# Patient Record
Sex: FEMALE | Race: WHITE
[De-identification: names, ages, dates, MRNs, and addresses within clinical notes are randomized per-mention and may not be internally consistent; named-entity substitution may affect disease eponyms.]

---

## 2018-02-13 ENCOUNTER — HOSPITAL ENCOUNTER (INPATIENT)
Dept: HOSPITAL 92 - SURG A | Age: 83
LOS: 8 days | Discharge: HOME | DRG: 470 | End: 2018-02-21
Attending: ORTHOPAEDIC SURGERY | Admitting: ORTHOPAEDIC SURGERY
Payer: MEDICARE

## 2018-02-13 ENCOUNTER — HOSPITAL ENCOUNTER (OUTPATIENT)
Dept: HOSPITAL 92 - LABBT | Age: 83
Discharge: HOME | End: 2018-02-13
Attending: ORTHOPAEDIC SURGERY
Payer: MEDICARE

## 2018-02-13 VITALS — BODY MASS INDEX: 30.9 KG/M2

## 2018-02-13 DIAGNOSIS — Z01.810: Primary | ICD-10-CM

## 2018-02-13 DIAGNOSIS — E55.9: ICD-10-CM

## 2018-02-13 DIAGNOSIS — M17.12: Primary | ICD-10-CM

## 2018-02-13 DIAGNOSIS — I10: ICD-10-CM

## 2018-02-13 DIAGNOSIS — Z87.891: ICD-10-CM

## 2018-02-13 DIAGNOSIS — E78.5: ICD-10-CM

## 2018-02-13 DIAGNOSIS — M17.12: ICD-10-CM

## 2018-02-13 DIAGNOSIS — F32.9: ICD-10-CM

## 2018-02-13 DIAGNOSIS — K21.9: ICD-10-CM

## 2018-02-13 DIAGNOSIS — F41.9: ICD-10-CM

## 2018-02-13 DIAGNOSIS — E03.9: ICD-10-CM

## 2018-02-13 DIAGNOSIS — Z01.812: ICD-10-CM

## 2018-02-13 LAB
ANION GAP SERPL CALC-SCNC: 13 MMOL/L (ref 10–20)
APTT PPP: 25.1 SEC (ref 22.9–36.1)
BUN SERPL-MCNC: 21 MG/DL (ref 9.8–20.1)
CALCIUM SERPL-MCNC: 9.4 MG/DL (ref 7.8–10.44)
CHLORIDE SERPL-SCNC: 104 MMOL/L (ref 98–107)
CO2 SERPL-SCNC: 25 MMOL/L (ref 23–31)
CREAT CL PREDICTED SERPL C-G-VRATE: 0 ML/MIN (ref 70–130)
GLUCOSE SERPL-MCNC: 99 MG/DL (ref 83–110)
HGB BLD-MCNC: 13.1 G/DL (ref 12–16)
INR PPP: 0.9
MCH RBC QN AUTO: 27.6 PG (ref 27–31)
MCV RBC AUTO: 83.7 FL (ref 81–99)
PLATELET # BLD AUTO: 244 THOU/UL (ref 130–400)
POTASSIUM SERPL-SCNC: 3.1 MMOL/L (ref 3.5–5.1)
PROTHROMBIN TIME: 12.7 SEC (ref 12–14.7)
RBC # BLD AUTO: 4.75 MILL/UL (ref 4.2–5.4)
SODIUM SERPL-SCNC: 139 MMOL/L (ref 136–145)
WBC # BLD AUTO: 5.6 THOU/UL (ref 4.8–10.8)

## 2018-02-13 PROCEDURE — C1713 ANCHOR/SCREW BN/BN,TIS/BN: HCPCS

## 2018-02-13 PROCEDURE — 85027 COMPLETE CBC AUTOMATED: CPT

## 2018-02-13 PROCEDURE — 93005 ELECTROCARDIOGRAM TRACING: CPT

## 2018-02-13 PROCEDURE — 86901 BLOOD TYPING SEROLOGIC RH(D): CPT

## 2018-02-13 PROCEDURE — 85610 PROTHROMBIN TIME: CPT

## 2018-02-13 PROCEDURE — 83036 HEMOGLOBIN GLYCOSYLATED A1C: CPT

## 2018-02-13 PROCEDURE — A4306 DRUG DELIVERY SYSTEM <=50 ML: HCPCS

## 2018-02-13 PROCEDURE — C1776 JOINT DEVICE (IMPLANTABLE): HCPCS

## 2018-02-13 PROCEDURE — 36415 COLL VENOUS BLD VENIPUNCTURE: CPT

## 2018-02-13 PROCEDURE — 85730 THROMBOPLASTIN TIME PARTIAL: CPT

## 2018-02-13 PROCEDURE — 86900 BLOOD TYPING SEROLOGIC ABO: CPT

## 2018-02-13 PROCEDURE — 80053 COMPREHEN METABOLIC PANEL: CPT

## 2018-02-13 PROCEDURE — 83735 ASSAY OF MAGNESIUM: CPT

## 2018-02-13 PROCEDURE — 87081 CULTURE SCREEN ONLY: CPT

## 2018-02-13 PROCEDURE — 93010 ELECTROCARDIOGRAM REPORT: CPT

## 2018-02-13 PROCEDURE — 86850 RBC ANTIBODY SCREEN: CPT

## 2018-02-13 PROCEDURE — 80048 BASIC METABOLIC PNL TOTAL CA: CPT

## 2018-02-13 PROCEDURE — S0020 INJECTION, BUPIVICAINE HYDRO: HCPCS

## 2018-02-19 PROCEDURE — 0SRD0J9 REPLACEMENT OF LEFT KNEE JOINT WITH SYNTHETIC SUBSTITUTE, CEMENTED, OPEN APPROACH: ICD-10-PCS | Performed by: ORTHOPAEDIC SURGERY

## 2018-02-19 PROCEDURE — 3E0T3BZ INTRODUCTION OF ANESTHETIC AGENT INTO PERIPHERAL NERVES AND PLEXI, PERCUTANEOUS APPROACH: ICD-10-PCS | Performed by: ORTHOPAEDIC SURGERY

## 2018-02-19 RX ADMIN — ASPIRIN SCH MG: 81 TABLET ORAL at 20:27

## 2018-02-19 RX ADMIN — Medication SCH GM: at 20:27

## 2018-02-19 RX ADMIN — HYDROCODONE BITARTRATE AND ACETAMINOPHEN PRN TAB: 7.5; 325 TABLET ORAL at 22:21

## 2018-02-19 RX ADMIN — DOCUSATE SODIUM 50 MG AND SENNOSIDES 8.6 MG SCH TAB: 8.6; 5 TABLET, FILM COATED ORAL at 20:28

## 2018-02-19 NOTE — RAD
LEFT KNEE TWO VIEWS:

 

History: Post op. 

 

FINDINGS: 

Total knee prosthesis is in place. No fracture. Prosthesis is in good position. 

 

IMPRESSION: 

Post-operative change with placement of left knee prosthesis. 

 

POS: [facility]

## 2018-02-20 LAB
HGB BLD-MCNC: 10.7 G/DL (ref 12–16)
MCH RBC QN AUTO: 27.8 PG (ref 27–31)
MCV RBC AUTO: 85.2 FL (ref 81–99)
PLATELET # BLD AUTO: 171 THOU/UL (ref 130–400)
RBC # BLD AUTO: 3.84 MILL/UL (ref 4.2–5.4)
WBC # BLD AUTO: 7.2 THOU/UL (ref 4.8–10.8)

## 2018-02-20 RX ADMIN — ASPIRIN SCH MG: 81 TABLET ORAL at 20:17

## 2018-02-20 RX ADMIN — DOCUSATE SODIUM 50 MG AND SENNOSIDES 8.6 MG SCH TAB: 8.6; 5 TABLET, FILM COATED ORAL at 20:17

## 2018-02-20 RX ADMIN — HYDROCODONE BITARTRATE AND ACETAMINOPHEN PRN TAB: 7.5; 325 TABLET ORAL at 20:18

## 2018-02-20 RX ADMIN — ASPIRIN SCH MG: 81 TABLET ORAL at 09:30

## 2018-02-20 RX ADMIN — HYDROCODONE BITARTRATE AND ACETAMINOPHEN PRN TAB: 7.5; 325 TABLET ORAL at 13:13

## 2018-02-20 RX ADMIN — HYDROCODONE BITARTRATE AND ACETAMINOPHEN PRN TAB: 7.5; 325 TABLET ORAL at 09:26

## 2018-02-20 RX ADMIN — MULTIPLE VITAMINS W/ MINERALS TAB SCH TAB: TAB at 09:30

## 2018-02-20 RX ADMIN — HYDROCODONE BITARTRATE AND ACETAMINOPHEN PRN TAB: 7.5; 325 TABLET ORAL at 03:48

## 2018-02-20 RX ADMIN — DOCUSATE SODIUM 50 MG AND SENNOSIDES 8.6 MG SCH TAB: 8.6; 5 TABLET, FILM COATED ORAL at 09:30

## 2018-02-20 RX ADMIN — Medication SCH GM: at 04:23

## 2018-02-20 NOTE — CON
DATE OF CONSULTATION:  02/20/2018

 

REASON FOR CONSULTATION:  Medical management.

 

HISTORY OF PRESENT ILLNESS:  The patient is a pleasant 82-year-old female known to us with a history 
of multiple medical problems to include hypertension and hyperlipidemia.  She underwent a left total 
knee arthroplasty on 02/19/2018 and is postoperatively doing well and appears to be hemodynamically s
table, in no acute distress.  Consultation was sought for medical management.

 

She denies any chest, arm or back pain.  She also denies any PND, orthopnea or palpitations.

 

PAST MEDICAL HISTORY:

1.  Hypertension.

2.  Hyperlipidemia.

3.  Gastroesophageal reflux disease.

4.  History of vitamin D insufficiency.  

5.  History of depression and anxiety.  

6.  Hypothyroidism.

 

PAST SURGICAL HISTORY:

1.  History of total hysterectomy.

2.  History of appendectomy.  

 

ALLERGIES:  None.

 

MEDICATIONS:

1.  Atorvastatin 40 mg at bedtime.

2.  Cardura 180 mg

3.  Prozac 20 mg daily.  

4.  Hydrochlorothiazide 25 mg daily.

5.  Hydrocodone p.r.n.

6.  Levothyroxine 100 mcg daily.

7.  Meloxicam 15 mg every day.

8.  Prilosec 40 mg every day.

 

SOCIAL HISTORY:  She does not smoke or drink alcohol.

 

FAMILY HISTORY:  Noncontributory.

 

REVIEW OF SYSTEMS:

GENERAL:  Admits to weakness, fatigue, no fever or chills.

HEENT:  No diplopia, amaurosis fugax, tinnitus, sore throat or hoarseness.

CARDIOVASCULAR:  No chest, arm or back pain.

PULMONARY:  No PE, cough, hemoptysis.  

GASTROINTESTINAL:  No GI bleed, constipation, diarrhea.

GENITOURINARY:  No dysuria, nocturia, oliguria or polyuria.

ENDOCRINE:  No polyphagia, polydipsia or heat or cold intolerance.

MUSCULOSKELETAL:  Admits to arthralgias.  No lupus or myopathy.

NEUROLOGIC:  No history of TIA or seizure.  

 

All other systems are negative.

 

PHYSICAL EXAMINATION:

GENERAL:  [place] female who appears to be in no acute distress.

VITAL SIGNS:  Her blood pressure 140/70, pulse 60, respirations 18.  She is afebrile.

NECK:  Supple with no increased JVP or carotid bruit.  Carotid had good upstroke with no thyromegaly.


COR:  Regular rate and rhythm.

CHEST:  Symmetrical.  Clear to auscultation and percussion.

ABDOMEN:  Soft, nontender with normoactive bowel sounds.  There is no bruit or organomegaly.

EXTREMITIES:  No edema or cyanosis.  She had the left knee wrapped with ace.  She had palpable pedal 
pulses.

SKIN:  There is no evidence of ulcer, lesion or rash.

NEUROLOGIC:  She is awake, alert, and oriented to person, place, and time.

 

LABORATORY DATA:  Her H&H 10.7 and 32.7, white blood cells are fine.  There is no other lab in the 
art.

 

ASSESSMENT:

1.  Status post left total knee arthroplasty.

2.  Hypertension.

3.  Hyperlipidemia.

4.  Depression/anxiety.

5.  Hypothyroidism.

 

PLAN:  

1.  We will resume her home medications.  

2.  We will follow up with lab in the morning if the patient is still here.

 

I thank Dr. Silverman for allowing us to participate in the patient's care.

## 2018-02-20 NOTE — OP
DATE OF PROCEDURE:  02/19/2018

 

PREOPERATIVE DIAGNOSIS:  Osteoarthritis, left knee.

 

POSTOPERATIVE DIAGNOSIS:  Osteoarthritis, left knee.

 

PROCEDURE:  Left total knee arthroplasty.

 

ANESTHESIA:  General.

 

SURGEON:  Luca Silverman M.D.

 

COMPLICATIONS:  None.

 

CONDITION:  Good.

 

ESTIMATED BLOOD LOSS:  Minimal.

 

DRAINS:  None.

 

TOURNIQUET:  Per anesthesia.

 

TECHNIQUE:  Consent was obtained.  The patient was taken to the operating room, placed in the supine 
position.  After adequate general anesthesia was achieved, the patient's left knee was examined.  The
 patient had significant medial arthrosis with varus deformity using the reverse marked crepitus.  Sh
e lacked approximately 10 degrees full extension and had 115 degrees of flexion.  The left knee was t
hen positioned, prepped and draped in usual surgical sterile fashion.  Tourniquet placed on left uppe
r thigh.  Leg was elevated, exsanguinated, and tourniquet inflated prior to incision.  Standard midli
ne vertical incision was made, it was taken down to subcutaneous tissues exposing extensor mechanism.
  Medial parapatellar arthrotomy was performed.  Patella subluxed laterally.  The patient had signifi
cant tricompartmental disease with mainly medial compartment erosion and large osteophytes.  Using in
tramedullary guide, a distal 5 degree valgus resection on the femur was performed using AP and epicon
dylar access.  Proper rotation and position of the size 3 cutting block was placed.  Anterior, poster
ior, and chamfer cuts were completed for the size 3 evolution femur.  Tibia was then subluxed anterio
rly.  Using external guide, appropriate resection was performed measuring from the medial compartment
.  Minimal resection was obtained, the menisci and cruciate ligaments were then debrided and flexion 
and extension gaps were then checked and had good balancing at 0 to 90 degrees with 10 mm spacer.  Th
e patella was then measured approximately 6-7 mm resection performed in a 32 mm patella.  A 29 mm pat
carmen was medialized.  Trial components were then placed 3 femur, 3 tibia, and 29 patella with a 10 mm
 bearing surface.  Put through range of motion.  The patient had full flexion and extension, good bal
ancing and normal patellofemoral alignment tracking.  The surfaces were then irrigated copiously, dri
ed, and the femur, tibia, and patella cemented.  Excess cement removed and again trialed with the 10 
mm bearing surface and a 10 mm medial pivot bearing implant was then placed and snap fit.  Hemostasis
 obtained.  Arthrotomy closed with #2 mersilene, #1 Vicryl, subcutaneous with 0 and 2-0 Vicryl, and t
he skin with staples.  Sterile bulky dressing was applied and the patient was taken to recovery in st
able condition.  Prognosis is good.  Begin rehab protocol.

## 2018-02-21 VITALS — DIASTOLIC BLOOD PRESSURE: 67 MMHG | TEMPERATURE: 97.5 F | SYSTOLIC BLOOD PRESSURE: 123 MMHG

## 2018-02-21 LAB
ALBUMIN SERPL BCG-MCNC: 3.2 G/DL (ref 3.4–4.8)
ALP SERPL-CCNC: 64 U/L (ref 40–150)
ALT SERPL W P-5'-P-CCNC: 8 U/L (ref 8–55)
ANION GAP SERPL CALC-SCNC: 9 MMOL/L (ref 10–20)
AST SERPL-CCNC: 15 U/L (ref 5–34)
BASOPHILS # BLD AUTO: 0 THOU/UL (ref 0–0.2)
BASOPHILS NFR BLD AUTO: 0.4 % (ref 0–1)
BILIRUB SERPL-MCNC: 0.8 MG/DL (ref 0.2–1.2)
BUN SERPL-MCNC: 12 MG/DL (ref 9.8–20.1)
CALCIUM SERPL-MCNC: 8.6 MG/DL (ref 7.8–10.44)
CHLORIDE SERPL-SCNC: 98 MMOL/L (ref 98–107)
CO2 SERPL-SCNC: 32 MMOL/L (ref 23–31)
CREAT CL PREDICTED SERPL C-G-VRATE: 69 ML/MIN (ref 70–130)
EOSINOPHIL # BLD AUTO: 0.1 THOU/UL (ref 0–0.7)
EOSINOPHIL NFR BLD AUTO: 1.2 % (ref 0–10)
GLOBULIN SER CALC-MCNC: 2.6 G/DL (ref 2.4–3.5)
GLUCOSE SERPL-MCNC: 93 MG/DL (ref 83–110)
HGB BLD-MCNC: 10.3 G/DL (ref 12–16)
HGB BLD-MCNC: 10.4 G/DL (ref 12–16)
LYMPHOCYTES # BLD: 1.4 THOU/UL (ref 1.2–3.4)
LYMPHOCYTES NFR BLD AUTO: 16.6 % (ref 21–51)
MCH RBC QN AUTO: 27.5 PG (ref 27–31)
MCH RBC QN AUTO: 27.6 PG (ref 27–31)
MCV RBC AUTO: 85.5 FL (ref 81–99)
MCV RBC AUTO: 85.5 FL (ref 81–99)
MONOCYTES # BLD AUTO: 1.2 THOU/UL (ref 0.11–0.59)
MONOCYTES NFR BLD AUTO: 14.2 % (ref 0–10)
NEUTROPHILS # BLD AUTO: 5.8 THOU/UL (ref 1.4–6.5)
NEUTROPHILS NFR BLD AUTO: 67.6 % (ref 42–75)
PLATELET # BLD AUTO: 175 THOU/UL (ref 130–400)
PLATELET # BLD AUTO: 176 THOU/UL (ref 130–400)
POTASSIUM SERPL-SCNC: 2.6 MMOL/L (ref 3.5–5.1)
POTASSIUM SERPL-SCNC: 2.7 MMOL/L (ref 3.5–5.1)
RBC # BLD AUTO: 3.74 MILL/UL (ref 4.2–5.4)
RBC # BLD AUTO: 3.77 MILL/UL (ref 4.2–5.4)
SODIUM SERPL-SCNC: 136 MMOL/L (ref 136–145)
WBC # BLD AUTO: 7.9 THOU/UL (ref 4.8–10.8)
WBC # BLD AUTO: 8.5 THOU/UL (ref 4.8–10.8)

## 2018-02-21 RX ADMIN — HYDROCODONE BITARTRATE AND ACETAMINOPHEN PRN TAB: 7.5; 325 TABLET ORAL at 09:50

## 2018-02-21 RX ADMIN — MULTIPLE VITAMINS W/ MINERALS TAB SCH TAB: TAB at 08:55

## 2018-02-21 RX ADMIN — HYDROCODONE BITARTRATE AND ACETAMINOPHEN PRN TAB: 7.5; 325 TABLET ORAL at 05:57

## 2018-02-21 RX ADMIN — HYDROCODONE BITARTRATE AND ACETAMINOPHEN PRN TAB: 7.5; 325 TABLET ORAL at 14:12

## 2018-02-21 RX ADMIN — ASPIRIN SCH MG: 81 TABLET ORAL at 08:54

## 2018-02-21 RX ADMIN — DOCUSATE SODIUM 50 MG AND SENNOSIDES 8.6 MG SCH TAB: 8.6; 5 TABLET, FILM COATED ORAL at 08:55

## 2018-07-05 ENCOUNTER — HOSPITAL ENCOUNTER (OUTPATIENT)
Dept: HOSPITAL 92 - LABBT | Age: 83
Discharge: HOME | End: 2018-07-05
Attending: ORTHOPAEDIC SURGERY
Payer: MEDICARE

## 2018-07-05 DIAGNOSIS — Z01.818: Primary | ICD-10-CM

## 2018-07-05 DIAGNOSIS — M17.11: ICD-10-CM

## 2018-07-05 LAB
ANION GAP SERPL CALC-SCNC: 14 MMOL/L (ref 10–20)
APTT PPP: 22.8 SEC (ref 22.9–36.1)
BUN SERPL-MCNC: 16 MG/DL (ref 9.8–20.1)
CALCIUM SERPL-MCNC: 9.7 MG/DL (ref 7.8–10.44)
CHLORIDE SERPL-SCNC: 102 MMOL/L (ref 98–107)
CO2 SERPL-SCNC: 28 MMOL/L (ref 23–31)
CREAT CL PREDICTED SERPL C-G-VRATE: 0 ML/MIN (ref 70–130)
GLUCOSE SERPL-MCNC: 88 MG/DL (ref 83–110)
HGB BLD-MCNC: 11.2 G/DL (ref 12–16)
INR PPP: 1
MCH RBC QN AUTO: 26.2 PG (ref 27–31)
MCV RBC AUTO: 79.8 FL (ref 78–98)
PLATELET # BLD AUTO: 247 THOU/UL (ref 130–400)
POTASSIUM SERPL-SCNC: 3.7 MMOL/L (ref 3.5–5.1)
PROTHROMBIN TIME: 13.3 SEC (ref 12–14.7)
RBC # BLD AUTO: 4.27 MILL/UL (ref 4.2–5.4)
SODIUM SERPL-SCNC: 140 MMOL/L (ref 136–145)
WBC # BLD AUTO: 5.8 THOU/UL (ref 4.8–10.8)

## 2018-07-05 PROCEDURE — 86850 RBC ANTIBODY SCREEN: CPT

## 2018-07-05 PROCEDURE — 86900 BLOOD TYPING SEROLOGIC ABO: CPT

## 2018-07-05 PROCEDURE — 93010 ELECTROCARDIOGRAM REPORT: CPT

## 2018-07-05 PROCEDURE — 85730 THROMBOPLASTIN TIME PARTIAL: CPT

## 2018-07-05 PROCEDURE — 85610 PROTHROMBIN TIME: CPT

## 2018-07-05 PROCEDURE — 93005 ELECTROCARDIOGRAM TRACING: CPT

## 2018-07-05 PROCEDURE — 87081 CULTURE SCREEN ONLY: CPT

## 2018-07-05 PROCEDURE — 86901 BLOOD TYPING SEROLOGIC RH(D): CPT

## 2018-07-05 PROCEDURE — 80048 BASIC METABOLIC PNL TOTAL CA: CPT

## 2018-07-05 PROCEDURE — 85027 COMPLETE CBC AUTOMATED: CPT

## 2018-07-09 ENCOUNTER — HOSPITAL ENCOUNTER (OUTPATIENT)
Dept: HOSPITAL 92 - SDC | Age: 83
LOS: 2 days | Discharge: HOME | End: 2018-07-11
Attending: ORTHOPAEDIC SURGERY
Payer: MEDICARE

## 2018-07-09 VITALS — BODY MASS INDEX: 29.2 KG/M2

## 2018-07-09 DIAGNOSIS — I10: ICD-10-CM

## 2018-07-09 DIAGNOSIS — M19.90: ICD-10-CM

## 2018-07-09 DIAGNOSIS — F32.9: ICD-10-CM

## 2018-07-09 DIAGNOSIS — Z79.82: ICD-10-CM

## 2018-07-09 DIAGNOSIS — K21.9: ICD-10-CM

## 2018-07-09 DIAGNOSIS — M17.11: Primary | ICD-10-CM

## 2018-07-09 DIAGNOSIS — Z79.899: ICD-10-CM

## 2018-07-09 DIAGNOSIS — Z87.891: ICD-10-CM

## 2018-07-09 PROCEDURE — A4216 STERILE WATER/SALINE, 10 ML: HCPCS

## 2018-07-09 PROCEDURE — 96374 THER/PROPH/DIAG INJ IV PUSH: CPT

## 2018-07-09 PROCEDURE — C1713 ANCHOR/SCREW BN/BN,TIS/BN: HCPCS

## 2018-07-09 PROCEDURE — 97139 UNLISTED THERAPEUTIC PX: CPT

## 2018-07-09 PROCEDURE — 36415 COLL VENOUS BLD VENIPUNCTURE: CPT

## 2018-07-09 PROCEDURE — 97150 GROUP THERAPEUTIC PROCEDURES: CPT

## 2018-07-09 PROCEDURE — 97530 THERAPEUTIC ACTIVITIES: CPT

## 2018-07-09 PROCEDURE — C1776 JOINT DEVICE (IMPLANTABLE): HCPCS

## 2018-07-09 PROCEDURE — S0020 INJECTION, BUPIVICAINE HYDRO: HCPCS

## 2018-07-09 PROCEDURE — 27447 TOTAL KNEE ARTHROPLASTY: CPT

## 2018-07-09 PROCEDURE — 85027 COMPLETE CBC AUTOMATED: CPT

## 2018-07-09 PROCEDURE — 0SRC0J9 REPLACEMENT OF RIGHT KNEE JOINT WITH SYNTHETIC SUBSTITUTE, CEMENTED, OPEN APPROACH: ICD-10-PCS | Performed by: ORTHOPAEDIC SURGERY

## 2018-07-09 PROCEDURE — 73560 X-RAY EXAM OF KNEE 1 OR 2: CPT

## 2018-07-09 PROCEDURE — 97116 GAIT TRAINING THERAPY: CPT

## 2018-07-09 RX ADMIN — DOCUSATE SODIUM 50 MG AND SENNOSIDES 8.6 MG SCH TAB: 8.6; 5 TABLET, FILM COATED ORAL at 20:29

## 2018-07-09 RX ADMIN — Medication SCH GM: at 18:46

## 2018-07-09 RX ADMIN — ASPIRIN SCH MG: 81 TABLET ORAL at 20:28

## 2018-07-09 RX ADMIN — Medication SCH: at 20:29

## 2018-07-09 RX ADMIN — Medication SCH: at 15:17

## 2018-07-09 NOTE — RAD
RIGHT KNEE TWO VIEWS:

 

HISTORY:

Status post right knee arthroplasty.

 

COMPARISON:

None.

 

FINDINGS:

Two views of the right knee show the patient to be status post right knee arthroplasty without periha
rdware lucency or fracture.  Air in the soft tissues and overlying skin staples are from recent surge
ry.

 

IMPRESSION:

Status post right knee arthroplasty without evidence of complication.

 

POS: [facility]

## 2018-07-09 NOTE — CON
DATE OF CONSULTATION:  07/09/2018

 

DATE OF ADMISSION:  07/09/2018

 

REASON FOR CONSULTATION:  Medical management.

 

HISTORY OF PRESENT ILLNESS:  Patient is an 83-year-old female with multiple medical problems to [facility]
de hypertension and hyperlipidemia who underwent a right hip replacement in February of this year and
 today has undergone a right knee replacement.  Postoperatively, she is doing well.  Appears to be he
modynamically stable, in no acute distress.  I will provide medical management for this patient while
 hospitalized.

 

PAST MEDICAL HISTORY:  Includes hypertension, hyperlipidemia, gastroesophageal reflux, vitamin D defi
ciency, depression, anxiety and hypothyroidism.

 

PAST SURGICAL HISTORY:  Includes total hysterectomy, appendectomy, right hip replacement and today to
antonina right knee replacement.

 

ALLERGIES:  None.

 

MEDICATIONS:  Include atorvastatin 40 at bedtime, Cardura 100 mg daily, Prozac 20 mg daily, HCTZ 25 m
g daily, hydrocodone p.r.n., levothyroxine 100 mcg daily, Meloxicam 15 mg daily and Prilosec 40 mg da
vicki.

 

SOCIAL HISTORY:  She does not smoke or drink alcoholic beverages.

 

FAMILY HISTORY:  Noncontributory.

 

REVIEW OF SYSTEMS:  GENERAL:  Denies fever, chills, fatigue.  HEENT:  Denies diplopia, loss of vision
, hoarseness, drainage, eye pain.  Ears and nose pain, throat pain.  CHEST:  Denies shortness of ethan
th or dyspnea.  CARDIOVASCULAR:  Denies chest pain or palpitations.

GI:  Denies constipation, diarrhea and nausea.  :  Denies dysuria, oliguria or polyuria.  ENDOCRINE
:  There is no polyphagia, polydipsia, heat or cold intolerance.  MUSCULOSKELETAL:  She is here for r
ight knee pain and currently her greatest pain is actually in her right hip.  NEUROLOGIC:  Neurologic
ally denies TIAs, memory loss, seizures.

SKIN:  No new rashes or lesions.  PSYCHOLOGICAL:  No new depression or anxiety.

 

PHYSICAL EXAMINATION:

VITAL SIGNS:  At the time of admission, blood pressure is 131/58, pulse 60, respirations 16, temperat
ure 98 degrees and O2 sat 98%.

GENERAL:  This is a well-developed, well-nourished, alert, responsive elderly female in no acute dist
ress.

HEENT:  Normocephalic and atraumatic.  Pupils are equal, round, and reactive to light.  Arcus senilis
 bilaterally.  TMs; nares, pharynx clear.

NECK:  Supple, trachea midline.

CHEST:  Clear to auscultation.  Breast exam deferred.

BACK:  Nontender.

HEART:  Regular rate and rhythm, no murmur.

ABDOMEN:  Soft and nontender without organomegaly.

GENITOURINARY:  Deferred.

EXTREMITIES:  Right knee is bandaged with some swelling noted.  Left lower extremity and upper extrem
ities are without clubbing, cyanosis, or edema.  Normal range of motion.

SKIN:  Without acute rashes or lesions.

NEUROLOGIC:  Cranial nerves are intact.  Unable to test gait and cerebellar function at this time.  S
ensory exam is grossly intact except over the right knee surgical area.

 

LABORATORY DATA:  Lab work thus far.  On 07/05/2018, WBCs are 5.8, hemoglobin 11.2, hematocrit 34.1 w
ith platelets at 247,000.  PT is 13.3, INR 1.0 APTT of 22.8.  Sodium is 140, potassium 3.7, chloride 
102, CO2 28, BUN 16, creatinine 0.85 with GFR of 63, glucose is 88, hemoglobin A1c 5.3, calcium 9.7, 
total bilirubin 0.8, AST 15, ALT 8, alkaline phosphatase 64.

 

ASSESSMENT:

1.  Status post right total knee replacement.

2.  Hypertension.

3.  Hyperlipidemia.

4.  Gastroesophageal reflux disease.

 

PLAN:  Plan is to provide the routine medication and serial reevaluation to medically care for this p
atient.  Anticipate a normal postop surgical course and follow up will be in my office after discharg
eMitch

## 2018-07-10 LAB
HGB BLD-MCNC: 9.3 G/DL (ref 12–16)
MCH RBC QN AUTO: 26 PG (ref 27–31)
MCV RBC AUTO: 80.2 FL (ref 78–98)
PLATELET # BLD AUTO: 193 THOU/UL (ref 130–400)
RBC # BLD AUTO: 3.57 MILL/UL (ref 4.2–5.4)
WBC # BLD AUTO: 7.3 THOU/UL (ref 4.8–10.8)

## 2018-07-10 RX ADMIN — Medication SCH GM: at 03:00

## 2018-07-10 RX ADMIN — BUPIVACAINE HYDROCHLORIDE SCH MLS: 5 INJECTION, SOLUTION EPIDURAL; INTRACAUDAL at 01:05

## 2018-07-10 RX ADMIN — MULTIPLE VITAMINS W/ MINERALS TAB SCH TAB: TAB at 09:32

## 2018-07-10 RX ADMIN — Medication SCH ML: at 20:48

## 2018-07-10 RX ADMIN — CALCIUM CARBONATE PRN MG: 500 TABLET, CHEWABLE ORAL at 11:09

## 2018-07-10 RX ADMIN — CALCIUM CARBONATE PRN MG: 500 TABLET, CHEWABLE ORAL at 01:05

## 2018-07-10 RX ADMIN — HYDROCODONE BITARTRATE AND ACETAMINOPHEN PRN TAB: 7.5; 325 TABLET ORAL at 18:11

## 2018-07-10 RX ADMIN — DOCUSATE SODIUM 50 MG AND SENNOSIDES 8.6 MG SCH TAB: 8.6; 5 TABLET, FILM COATED ORAL at 09:32

## 2018-07-10 RX ADMIN — HYDROCODONE BITARTRATE AND ACETAMINOPHEN PRN TAB: 7.5; 325 TABLET ORAL at 00:16

## 2018-07-10 RX ADMIN — ASPIRIN SCH MG: 81 TABLET ORAL at 20:46

## 2018-07-10 RX ADMIN — Medication SCH ML: at 09:50

## 2018-07-10 RX ADMIN — HYDROCODONE BITARTRATE AND ACETAMINOPHEN PRN TAB: 7.5; 325 TABLET ORAL at 14:04

## 2018-07-10 RX ADMIN — BUPIVACAINE HYDROCHLORIDE SCH MLS: 5 INJECTION, SOLUTION EPIDURAL; INTRACAUDAL at 14:37

## 2018-07-10 RX ADMIN — CALCIUM CARBONATE PRN MG: 500 TABLET, CHEWABLE ORAL at 23:14

## 2018-07-10 RX ADMIN — CALCIUM CARBONATE PRN MG: 500 TABLET, CHEWABLE ORAL at 18:11

## 2018-07-10 RX ADMIN — ASPIRIN SCH MG: 81 TABLET ORAL at 09:30

## 2018-07-10 RX ADMIN — DOCUSATE SODIUM 50 MG AND SENNOSIDES 8.6 MG SCH TAB: 8.6; 5 TABLET, FILM COATED ORAL at 20:46

## 2018-07-10 RX ADMIN — HYDROCODONE BITARTRATE AND ACETAMINOPHEN PRN TAB: 7.5; 325 TABLET ORAL at 06:00

## 2018-07-10 NOTE — OP
DATE OF PROCEDURE:  07/09/2018

 

PREOPERATIVE DIAGNOSIS:  Osteoarthrosis, right knee.

 

POSTOPERATIVE DIAGNOSIS:  Osteoarthrosis, right knee.

 

PROCEDURE:  Right total knee arthroplasty.

 

ANESTHESIA:  General.

 

SURGEON:  Dr. Luca Silverman 

 

ASSISTANT:   KAREN Umanzor

 

COMPLICATIONS:  None.

 

CONDITION:  Good.

 

ESTIMATED BLOOD LOSS:  Minimal.

 

DRAINS:  None.

 

TOURNIQUET:  Per anesthesia.

 

TECHNIQUE:  Consent was obtained.  The patient was taken to the operating room and placed in supine p
osition.  Adequate general anesthesia achieved, the patient's right knee was examined.  She had appro
ximately 110 degrees of flexion, lacked 10 degrees full extension, mild varus deformity.  She was the
n positioned, prepped, and draped in usual sterile fashion.  Tourniquet placed to right upper thigh. 
 Leg was elevated, exsanguinated, and tourniquet inflated prior to incision.  Standard midline vertic
al incision was made.  It was taken down to subcutaneous tissue exposing extensor mechanism.  Medial 
parapatellar arthrotomy was performed.  Patella subluxed laterally.  The patient had advanced medial 
and patellofemoral disease.  Using intramedullary guide, distal 5 degree resection on the femur was p
erformed.  Using AP and epicondylar access, proper rotation and position, a size 3 cutting block was 
placed.  Anterior, posterior, and chamfer cuts were completed for the size 3 evolution femur.  Tibia 
was then subluxed anteriorly.  Appropriate resection was performed using external guide.  Cruciate li
gaments were debrided and a flexion, extension gaps checked at 0 and 90 degrees, good balancing was n
oted.  The patella was then measured approximately 6-7 mm resection was performed and a 29 mm patella
 was medialized.  The trial components were then placed, a 3 femur, 3 tibia, 10 mm bearing surface, 2
9 patella was put through range of motion.  The patient had full flexion and extension, good alignmen
t and range of motion was noted.  The tibial baseplate was then marked and prepared with a broach.  A
ll surfaces were irrigated copiously, dried.  Femur, tibia, and patella cemented.  Again, trialed wit
h the 10 mm bearing surface.  This was chosen and snap fit.  After copious irrigation arthrotomy clos
ed #2 mersilene, #1 Vicryl, subq with 0 and 2-0 Vicryl, and the skin with staples.  Sterile bulky miguel
ssing was applied and the patient taken to recovery.  Prognosis is  good.

## 2018-07-11 VITALS — SYSTOLIC BLOOD PRESSURE: 110 MMHG | DIASTOLIC BLOOD PRESSURE: 57 MMHG | TEMPERATURE: 98.1 F

## 2018-07-11 LAB
HGB BLD-MCNC: 10.2 G/DL (ref 12–16)
MCH RBC QN AUTO: 25.6 PG (ref 27–31)
MCV RBC AUTO: 79.6 FL (ref 78–98)
PLATELET # BLD AUTO: 226 THOU/UL (ref 130–400)
RBC # BLD AUTO: 3.98 MILL/UL (ref 4.2–5.4)
WBC # BLD AUTO: 10.2 THOU/UL (ref 4.8–10.8)

## 2018-07-11 RX ADMIN — Medication SCH ML: at 09:19

## 2018-07-11 RX ADMIN — HYDROCODONE BITARTRATE AND ACETAMINOPHEN PRN TAB: 7.5; 325 TABLET ORAL at 12:19

## 2018-07-11 RX ADMIN — HYDROCODONE BITARTRATE AND ACETAMINOPHEN PRN TAB: 7.5; 325 TABLET ORAL at 06:21

## 2018-07-11 RX ADMIN — MULTIPLE VITAMINS W/ MINERALS TAB SCH TAB: TAB at 09:10

## 2018-07-11 RX ADMIN — BUPIVACAINE HYDROCHLORIDE SCH MLS: 5 INJECTION, SOLUTION EPIDURAL; INTRACAUDAL at 02:53

## 2018-07-11 RX ADMIN — DOCUSATE SODIUM 50 MG AND SENNOSIDES 8.6 MG SCH TAB: 8.6; 5 TABLET, FILM COATED ORAL at 09:10

## 2018-07-11 NOTE — PRG
DATE OF SERVICE:  07/11/2018 

 

The patient rested well.  Her knee is bothering her this morning and she is up getting her bath.

 

PHYSICAL EXAMINATION: 

VITAL SIGNS:  Stable with blood pressure on 121/69, pulse 64, temperature 99, respirations 18, O2 sat
 94%.

GENERAL:  Alert, elderly female in no acute distress, responsive.

HEENT:  Clear. 

NECK:  Supple.

CHEST:  Clear to auscultation.

HEART:  Regular rate and rhythm, no murmur.

ABDOMEN:  Soft and nontender.

EXTREMITIES:  Right lower extremity with bandage in place.  No other significant swelling, erythema, 
mild postoperative edema noted.  Moderate tenderness and pain with the pain rated at a 6/10 at this t
becky.

NEUROLOGIC:  Nonfocal and intact.

 

ASSESSMENT:

1.  Postoperative day right total knee replacement #2 and doing well.

2.  Hypertension, stable.

 

PLAN:  Continue to follow the patient to discharge.  She is doing well, no specific complaints and an
ticipate a routine hospital course.  We will follow up after discharge.

## 2019-04-22 ENCOUNTER — HOSPITAL ENCOUNTER (OUTPATIENT)
Dept: HOSPITAL 92 - BICULT | Age: 84
Discharge: HOME | End: 2019-04-22
Attending: FAMILY MEDICINE
Payer: MEDICARE

## 2019-04-22 DIAGNOSIS — E07.89: ICD-10-CM

## 2019-04-22 DIAGNOSIS — E04.9: Primary | ICD-10-CM

## 2019-04-22 PROCEDURE — 76536 US EXAM OF HEAD AND NECK: CPT

## 2019-04-22 NOTE — ULT
Thyroid ultrasound



INDICATION: Thyroid mass



TECHNIQUE: Grayscale and color Doppler images were obtained of the thyroid gland.



COMPARISON: None



FINDINGS:



Right thyroid lobe: The right thyroid lobe measures 5.4 x 2.0 x 2.1 cm. There is a well-circumscribed
 hyperechoic, mixed cystic and solid nodule involving the mid to inferior pole of the right thyroid

gland measuring 3.7 x 1.97 x 3.1 cm.



Thyroid isthmus: The thyroid isthmus measures 0.68 cm. There is a 5 x 6 x 6 mm hypoechoic well-circum
scribed solid nodule within the right isthmus with associated macrocalcifications.



Left thyroid lobe: The left thyroid lobe measures 4.4 x 3.0 x 2.2 cm. There is a mixed cystic and sol
id nodule involving the mid to inferior pole of the left thyroid gland measuring 2.9 x 2.5 x 1.9

cm.



IMPRESSION:

1. TI-RADS Category 2 lesion of the right thyroid lobe. No sonographic follow up is recommended.

2. TI-RADS Category 4 lesion of the thyroid isthmus. This lesion is below 1 cm in size. No sonographi
c follow up is recommended.

3. TI-RADS Category 2 lesion of the left thyroid lobe. No sonographic follow up is recommended.



Reported By: Cooper Reagan 

Electronically Signed:  4/22/2019 1:27 PM

## 2021-01-29 ENCOUNTER — HOSPITAL ENCOUNTER (OUTPATIENT)
Dept: HOSPITAL 92 - BICMRI | Age: 86
Discharge: HOME | End: 2021-01-29
Attending: FAMILY MEDICINE
Payer: MEDICARE

## 2021-01-29 DIAGNOSIS — D32.9: ICD-10-CM

## 2021-01-29 DIAGNOSIS — H02.401: Primary | ICD-10-CM

## 2021-01-29 LAB — ESTIMATED GFR-MDRD - POC: 53

## 2021-01-29 PROCEDURE — A9579 GAD-BASE MR CONTRAST NOS,1ML: HCPCS

## 2021-01-29 PROCEDURE — 70553 MRI BRAIN STEM W/O & W/DYE: CPT

## 2021-01-29 PROCEDURE — 82565 ASSAY OF CREATININE: CPT

## 2021-04-01 ENCOUNTER — HOSPITAL ENCOUNTER (OUTPATIENT)
Dept: HOSPITAL 92 - BICCT | Age: 86
Discharge: HOME | End: 2021-04-01
Attending: NURSE PRACTITIONER
Payer: MEDICARE

## 2021-04-01 DIAGNOSIS — G70.00: Primary | ICD-10-CM

## 2021-04-01 DIAGNOSIS — R91.8: ICD-10-CM

## 2021-04-01 LAB — ESTIMATED GFR-MDRD - POC: 53

## 2021-04-01 PROCEDURE — 82565 ASSAY OF CREATININE: CPT

## 2021-04-01 PROCEDURE — 71270 CT THORAX DX C-/C+: CPT

## 2021-04-23 ENCOUNTER — HOSPITAL ENCOUNTER (OUTPATIENT)
Dept: HOSPITAL 92 - BICULT | Age: 86
Discharge: HOME | End: 2021-04-23
Attending: OTOLARYNGOLOGY
Payer: MEDICARE

## 2021-04-23 DIAGNOSIS — E07.89: ICD-10-CM

## 2021-04-23 DIAGNOSIS — E01.0: Primary | ICD-10-CM

## 2021-04-23 PROCEDURE — 76536 US EXAM OF HEAD AND NECK: CPT
